# Patient Record
Sex: FEMALE | NOT HISPANIC OR LATINO | Employment: UNEMPLOYED | URBAN - METROPOLITAN AREA
[De-identification: names, ages, dates, MRNs, and addresses within clinical notes are randomized per-mention and may not be internally consistent; named-entity substitution may affect disease eponyms.]

---

## 2020-01-09 ENCOUNTER — TELEPHONE (OUTPATIENT)
Dept: FAMILY MEDICINE | Facility: CLINIC | Age: 30
End: 2020-01-09

## 2020-01-09 ENCOUNTER — ANCILLARY PROCEDURE (OUTPATIENT)
Dept: GENERAL RADIOLOGY | Facility: CLINIC | Age: 30
End: 2020-01-09
Attending: FAMILY MEDICINE
Payer: COMMERCIAL

## 2020-01-09 ENCOUNTER — OFFICE VISIT (OUTPATIENT)
Dept: FAMILY MEDICINE | Facility: CLINIC | Age: 30
End: 2020-01-09
Payer: COMMERCIAL

## 2020-01-09 VITALS
RESPIRATION RATE: 14 BRPM | HEIGHT: 71 IN | OXYGEN SATURATION: 100 % | WEIGHT: 131.5 LBS | HEART RATE: 71 BPM | BODY MASS INDEX: 18.41 KG/M2 | DIASTOLIC BLOOD PRESSURE: 56 MMHG | SYSTOLIC BLOOD PRESSURE: 86 MMHG | TEMPERATURE: 97.6 F

## 2020-01-09 DIAGNOSIS — F41.9 ANXIETY: ICD-10-CM

## 2020-01-09 DIAGNOSIS — M79.646 THUMB TENDERNESS: Primary | ICD-10-CM

## 2020-01-09 DIAGNOSIS — F33.2 SEVERE EPISODE OF RECURRENT MAJOR DEPRESSIVE DISORDER, WITHOUT PSYCHOTIC FEATURES (H): ICD-10-CM

## 2020-01-09 PROCEDURE — 73140 X-RAY EXAM OF FINGER(S): CPT | Mod: RT

## 2020-01-09 PROCEDURE — 99203 OFFICE O/P NEW LOW 30 MIN: CPT | Performed by: FAMILY MEDICINE

## 2020-01-09 PROCEDURE — 96127 BRIEF EMOTIONAL/BEHAV ASSMT: CPT | Performed by: FAMILY MEDICINE

## 2020-01-09 ASSESSMENT — PATIENT HEALTH QUESTIONNAIRE - PHQ9
5. POOR APPETITE OR OVEREATING: NEARLY EVERY DAY
SUM OF ALL RESPONSES TO PHQ QUESTIONS 1-9: 21

## 2020-01-09 ASSESSMENT — ANXIETY QUESTIONNAIRES
5. BEING SO RESTLESS THAT IT IS HARD TO SIT STILL: NEARLY EVERY DAY
IF YOU CHECKED OFF ANY PROBLEMS ON THIS QUESTIONNAIRE, HOW DIFFICULT HAVE THESE PROBLEMS MADE IT FOR YOU TO DO YOUR WORK, TAKE CARE OF THINGS AT HOME, OR GET ALONG WITH OTHER PEOPLE: SOMEWHAT DIFFICULT
GAD7 TOTAL SCORE: 17
2. NOT BEING ABLE TO STOP OR CONTROL WORRYING: MORE THAN HALF THE DAYS
6. BECOMING EASILY ANNOYED OR IRRITABLE: NEARLY EVERY DAY
7. FEELING AFRAID AS IF SOMETHING AWFUL MIGHT HAPPEN: MORE THAN HALF THE DAYS
1. FEELING NERVOUS, ANXIOUS, OR ON EDGE: MORE THAN HALF THE DAYS
3. WORRYING TOO MUCH ABOUT DIFFERENT THINGS: MORE THAN HALF THE DAYS

## 2020-01-09 ASSESSMENT — MIFFLIN-ST. JEOR: SCORE: 1401.26

## 2020-01-09 NOTE — PATIENT INSTRUCTIONS
1. Thumb tenderness  - XR Finger Right G/E 2 Views- no fractures  -wear wrist brace for comfort.  Ibuprofen with meals up to three times daily as needed    -if more concerns, see ortho

## 2020-01-09 NOTE — TELEPHONE ENCOUNTER
Called to give patient results and follow up instructions, if patient calls back please advise.    Xray showed no fracture  Dr. Martinez has sent an order for wrist brace too [facility]  If no improvement with wrist brace, she has placed a referral too [place] [facility] she can schedule with them at [phone]    Return for follow up or concerns in about 4 weeks ([id number])

## 2020-01-09 NOTE — NURSING NOTE
"Chief Complaint   Patient presents with     Musculoskeletal Problem     BP (!) 86/56   Pulse 71   Temp 97.6  F (36.4  C) (Oral)   Resp 14   Ht 1.793 m (5' 10.6\")   Wt 59.6 kg (131 lb 8 oz)   LMP 01/04/2020 (Exact Date)   SpO2 100%   BMI 18.55 kg/m   Estimated body mass index is 18.55 kg/m  as calculated from the following:    Height as of this encounter: 1.793 m (5' 10.6\").    Weight as of this encounter: 59.6 kg (131 lb 8 oz).  bp completed using cuff size: regular      Health Maintenance addressed:  NONE    n/a    Sandy Mcleod MA    "

## 2020-01-09 NOTE — PROGRESS NOTES
"Subjective     Sharri Bermeo is a 29 year old female who presents to clinic today for the following health issues:    HPI   Musculoskeletal problem/pain      Duration: 3 months    Description  Location: right thumb    Intensity:  severe    Accompanying signs and symptoms: swelling    History  Previous similar problem: no   Previous evaluation:  none    Precipitating or alleviating factors:  Trauma or overuse: YES- patient fell and used her arm to catch herself  Aggravating factors include: writing, or having to use the thumb at all    Therapies tried and outcome: heat and ice no relief    [facility] at [facility]  Admitted for alcoholism -since a month  History of depression , taking trazodone & buspirone, sertraline 100 mg once daily   PROBLEMS TO ADD ON...    BP Readings from Last 3 Encounters:   01/09/20 (!) 86/56   03/18/10 95/69    Wt Readings from Last 3 Encounters:   01/09/20 59.6 kg (131 lb 8 oz)   03/18/10 60.8 kg (134 lb)                    Reviewed and updated as needed this visit by Provider         Review of Systems   ROS COMP: Constitutional, HEENT, cardiovascular, pulmonary, GI, , musculoskeletal, neuro, skin, endocrine and psych systems are negative, except as otherwise noted.      Objective    BP (!) 86/56   Pulse 71   Temp 97.6  F (36.4  C) (Oral)   Resp 14   Ht 1.793 m (5' 10.6\")   Wt 59.6 kg (131 lb 8 oz)   LMP 01/04/2020 (Exact Date)   SpO2 100%   BMI 18.55 kg/m    Body mass index is 18.55 kg/m .  Physical Exam     Right thumb tenderness all long including snuff box. ROM is intact.  Rest of the hand/wrist exam- normal. No tendenress and full ROM  GENERAL: healthy, alert and no distress  RESP: lungs clear to auscultation - no rales, rhonchi or wheezes  CV: regular rates and rhythm and normal S1 S2, no S3 or S4  SKIN: no suspicious lesions or rashes  PSYCH: mentation appears normal, affect normal/bright  PHQ-9 SCORE 1/9/2020   PHQ-9 Total Score 21     WILLIE-7 SCORE 1/9/2020 "   Total Score 17       Diagnostic Test Results:  Labs reviewed in Epic        Assessment & Plan     1. Thumb tenderness, mild   - XR Finger Right G/E 2 Views- no fractures  -wear wrist brace for comfort.  Ibuprofen with meals up to three times daily as needed    -if more concerns, see ortho   - ORTHOPEDICS ADULT REFERRAL    2. Severe episode of recurrent major depressive disorder, without psychotic features (H)    3. Anxiety   at [facility]- pyschiatrits.  No medications changes           Return in about 4 weeks (around 2/6/2020) for concerns,unresolved.    Bambi Martinez MD  [facility]

## 2020-01-10 ASSESSMENT — ANXIETY QUESTIONNAIRES: GAD7 TOTAL SCORE: 17

## 2020-01-10 NOTE — RESULT ENCOUNTER NOTE
Please call patient ([facility] client)  Xray shows no fracture  Wear wrist brace for comfort.  Over the counter ibuprofen 600 mg three times daily as needed  For next 3-5 days  For pain   Ok to see orthopedic for further pain.

## 2020-01-10 NOTE — RESULT ENCOUNTER NOTE
Left message for patient to call back-    Nickie Polk MA  Medical Assistant  [facility]

## 2020-03-05 LAB — HIV 1+2 AB+HIV1 P24 AG SERPL QL IA: NONREACTIVE

## 2020-03-10 ENCOUNTER — OFFICE VISIT (OUTPATIENT)
Dept: FAMILY MEDICINE | Facility: CLINIC | Age: 30
End: 2020-03-10
Payer: COMMERCIAL

## 2020-03-10 VITALS
DIASTOLIC BLOOD PRESSURE: 68 MMHG | OXYGEN SATURATION: 100 % | WEIGHT: 141.5 LBS | SYSTOLIC BLOOD PRESSURE: 100 MMHG | RESPIRATION RATE: 14 BRPM | TEMPERATURE: 98.6 F | HEART RATE: 72 BPM

## 2020-03-10 DIAGNOSIS — F32.1 MAJOR DEPRESSIVE DISORDER, SINGLE EPISODE, MODERATE (H): Chronic | ICD-10-CM

## 2020-03-10 DIAGNOSIS — R10.13 EPIGASTRIC PAIN: ICD-10-CM

## 2020-03-10 DIAGNOSIS — K52.9 CHRONIC DIARRHEA: Primary | ICD-10-CM

## 2020-03-10 DIAGNOSIS — F43.10 PTSD (POST-TRAUMATIC STRESS DISORDER): Chronic | ICD-10-CM

## 2020-03-10 DIAGNOSIS — Z13.21 ENCOUNTER FOR VITAMIN DEFICIENCY SCREENING: ICD-10-CM

## 2020-03-10 PROBLEM — K21.9 ESOPHAGEAL REFLUX: Chronic | Status: ACTIVE | Noted: 2020-03-05

## 2020-03-10 PROBLEM — F10.20 ALCOHOL USE DISORDER, MODERATE, DEPENDENCE (H): Status: ACTIVE | Noted: 2017-05-27

## 2020-03-10 PROBLEM — F10.20 ALCOHOL USE DISORDER, MODERATE, DEPENDENCE (H): Chronic | Status: ACTIVE | Noted: 2017-05-27

## 2020-03-10 PROBLEM — K21.9 ESOPHAGEAL REFLUX: Status: ACTIVE | Noted: 2020-03-05

## 2020-03-10 LAB — CRP SERPL-MCNC: <2.9 MG/L (ref 0–8)

## 2020-03-10 RX ORDER — OMEGA-3/DHA/EPA/FISH OIL 60 MG-90MG
500 CAPSULE ORAL DAILY
COMMUNITY
Start: 2020-03-05 | End: 2020-08-20

## 2020-03-10 RX ORDER — SERTRALINE HYDROCHLORIDE 100 MG/1
TABLET, FILM COATED ORAL
Qty: 110 TABLET | Refills: 3 | Status: SHIPPED | OUTPATIENT
Start: 2020-03-10 | End: 2020-05-09

## 2020-03-10 RX ORDER — MULTIVITAMIN,THERAPEUTIC
1 TABLET ORAL EVERY MORNING
Qty: 90 TABLET | Refills: 3 | Status: SHIPPED | OUTPATIENT
Start: 2020-03-10 | End: 2020-08-20

## 2020-03-10 RX ORDER — BUSPIRONE HYDROCHLORIDE 15 MG/1
30 TABLET ORAL 3 TIMES DAILY PRN
Qty: 180 TABLET | Refills: 5 | Status: SHIPPED | OUTPATIENT
Start: 2020-03-10 | End: 2020-08-20

## 2020-03-10 RX ORDER — PRAZOSIN HYDROCHLORIDE 2 MG/1
2 CAPSULE ORAL AT BEDTIME
COMMUNITY
Start: 2020-03-05

## 2020-03-10 RX ORDER — ONDANSETRON 4 MG/1
4 TABLET, FILM COATED ORAL EVERY 8 HOURS PRN
COMMUNITY
Start: 2020-03-05 | End: 2020-03-10

## 2020-03-10 RX ORDER — BUSPIRONE HYDROCHLORIDE 10 MG/1
10 TABLET ORAL 3 TIMES DAILY PRN
COMMUNITY
Start: 2020-03-05 | End: 2020-03-10

## 2020-03-10 RX ORDER — SERTRALINE HYDROCHLORIDE 100 MG/1
100 TABLET, FILM COATED ORAL DAILY
COMMUNITY
Start: 2020-03-05 | End: 2020-03-10

## 2020-03-10 RX ORDER — VITAMIN A ACETATE, BETA CAROTENE, ASCORBIC ACID, CHOLECALCIFEROL, .ALPHA.-TOCOPHEROL ACETATE, DL-, THIAMINE MONONITRATE, RIBOFLAVIN, NIACINAMIDE, PYRIDOXINE HYDROCHLORIDE, FOLIC ACID, CYANOCOBALAMIN, CALCIUM CARBONATE, FERROUS FUMARATE, ZINC OXIDE, CUPRIC OXIDE 3080; 12; 120; 400; 1; 1.84; 3; 20; 22; 920; 25; 200; 27; 10; 2 [IU]/1; UG/1; MG/1; [IU]/1; MG/1; MG/1; MG/1; MG/1; MG/1; [IU]/1; MG/1; MG/1; MG/1; MG/1; MG/1
1 TABLET, FILM COATED ORAL DAILY
COMMUNITY
Start: 2020-03-05 | End: 2020-03-10

## 2020-03-10 RX ORDER — TRAZODONE HYDROCHLORIDE 100 MG/1
50 TABLET ORAL
COMMUNITY
Start: 2020-03-05

## 2020-03-10 NOTE — NURSING NOTE
29 year old  Chief Complaint   Patient presents with     Recheck Medication     wants her medications changed, states [facility] decreased dosages and didn't remember to prescribe her vitamins       Blood pressure 100/68, pulse 72, temperature 98.6  F (37  C), temperature source Oral, resp. rate 14, weight 64.2 kg (141 lb 8 oz), last menstrual period 02/28/2020, SpO2 100 %, not currently breastfeeding. There is no height or weight on file to calculate BMI.  Patient Active Problem List   Diagnosis     PTSD (post-traumatic stress disorder)     Major depressive disorder, single episode, moderate (H)     Esophageal reflux     Alcohol use disorder, moderate, dependence (H)       Wt Readings from Last 2 Encounters:   03/10/20 64.2 kg (141 lb 8 oz)     BP Readings from Last 3 Encounters:   03/10/20 100/68         Current Outpatient Medications   Medication     busPIRone (BUSPAR) 10 MG tablet     Omega-3 Fatty Acids (FISH OIL) 500 MG CAPS     ondansetron (ZOFRAN) 4 MG tablet     prazosin (MINIPRESS) 2 MG capsule     Prenatal Vit-Fe Fumarate-FA (PRENATAL PLUS) 27-1 MG TABS     sertraline (ZOLOFT) 100 MG tablet     Skin Protectants, Misc. (EUCERIN) cream     traZODone (DESYREL) 100 MG tablet     No current facility-administered medications for this visit.        Social History     Tobacco Use     Smoking status: Never Smoker     Smokeless tobacco: Never Used   Substance Use Topics     Alcohol use: Not Currently     Drug use: Never       Health Maintenance Due   Topic Date Due     PREVENTIVE CARE VISIT  1988     DEPRESSION ACTION PLAN  1988     PHQ-9  1988     DTAP/TDAP/TD IMMUNIZATION (1 - Tdap) 08/23/1999     HIV SCREENING  08/23/2003     PNEUMOCOCCAL IMMUNIZATION 19-64 MEDIUM RISK (1 of 1 - PPSV23) 08/23/2007     PAP  08/23/2009     INFLUENZA VACCINE (1) 09/01/2019       No results found for: PAP      March 10, 2020 3:34 PM

## 2020-03-10 NOTE — LETTER
March 12, 2020      Sharri Bermeo  [address] [address]        Dear ,      Your blood tests looked good.    The test for inflammation (CRP) was normal, which makes an inflammatory bowel disease less likely (though it isn't as good as the stool test for inflammation).    The test for celiac disease (tissue transglutaminase antibody and IgA) was negative.    Your vitamin D level was normal. The multivitamin has vitamin D in it which will help keep your level normal. We can talk next winter about doing a higher dose supplement as well.     Please reach out if you have any questions.      Rell Ramirez MD  2:15 PM, March 12, 2020      Resulted Orders   CRP inflammation   Result Value Ref Range    CRP Inflammation <2.9 0.0 - 8.0 mg/L   Tissue transglutaminase antibody IgA   Result Value Ref Range    Tissue Transglutaminase Antibody IgA 1 <7 U/mL      Comment:      Negative  The tTG-IgA assay has limited utility for patients with decreased levels of   IgA. Screening for celiac disease should include IgA testing to rule out   selective IgA deficiency and to guide selection and interpretation of   serological testing. tTG-IgG testing may be positive in celiac disease   patients with IgA deficiency.     IgA   Result Value Ref Range     84 - 499 mg/dL   Vitamin D Deficiency   Result Value Ref Range    Vitamin D Deficiency screening 28 20 - 75 ug/L      Comment:      Season, race, dietary intake, and treatment affect the concentration of   25-hydroxy-Vitamin D. Values may decrease during winter months and increase   during summer months. Values 20-29 ug/L may indicate Vitamin D insufficiency   and values <20 ug/L may indicate Vitamin D deficiency.  Vitamin D determination is routinely performed by an immunoassay specific for   25 hydroxyvitamin D3.  If an individual is on vitamin D2 (ergocalciferol)   supplementation, please specify 25 OH vitamin D2 and D3 level determination by   LCMSMS  test VITD23.         If you have any questions or concerns, please call the clinic at the number listed above.       Sincerely,        Rell Ramirez MD

## 2020-03-10 NOTE — PROGRESS NOTES
"  SUBJECTIVE:   Sharri Bermeo is a 29 year old female who presents to clinic today to establish care and to discuss the following problem(s).    - was seen for a physical at [facility] 5 days ago (3/5/20)  - 3/5/20 Hepatitis A IgG reactive  - 3/5/20 Hepatitis B surface antibiody reactive  - 3/5/20 Hepatitis C antibody nonreactive  - 3/5/20 HIV nonreactive  - 3/5/20 Quantiferon TB Gold Plus negative  - 3/5/20 Urine pregnancy test negative    # PTSD  # MDD   # AUD  - currently at residential treatment facility [facility] for the next year    - usually takes Buspirone 30mg three times a day  - at initial intake visit into [facility] this order was changed to 10mg three times a day for unclear reasons  - anxiety has been worse as a result    - typically also on Sertraline 200mg daily  - at some point between transfer to [place] and back again to [facility] this was reduced to 100mg daily  - would like to go back to her normal dose    - typically also on Trazodone 100mg at bedtime for sleep  - since being in [facility] has been on 50mg daily    # Vomiting  - since starting [facility] has been getting nauseated and throwing up  - has been going on for months  - feels nauseated after ever meal and sometimes before  - takes 4 Tums intermittently which sometimes helps  \"a little bit\"    - sometimes feel \"really tight\" in upper abdomen when nausea is more severe  - feels like \"hangover pains\" and nausea  - no relationship with periods  - averaging around 5 BMs a day recnetly  - had some black stools initially, then some green stools, now normal color  - before starting [facility] would have just one BM a day  - would sometimes get upper abdominal pains when drinking heavily in the past    ROS: Denies fevers, chills, chest pain, difficulty breathing, abdominal pain    Past Medical History:   Diagnosis Date     History of alcohol use disorder      MDD (major depressive disorder)      PTSD (post-traumatic stress " disorder)      No past surgical history on file.  No family history on file.  Social History     Tobacco Use     Smoking status: Never Smoker     Smokeless tobacco: Never Used   Substance Use Topics     Alcohol use: Not Currently     Drug use: Never     Social History     Social History Narrative     Not on file       Current Outpatient Medications   Medication     busPIRone (BUSPAR) 15 MG tablet     multivitamin, therapeutic (THERA-VIT) TABS tablet     prazosin (MINIPRESS) 2 MG capsule     sertraline (ZOLOFT) 100 MG tablet     Skin Protectants, Misc. (EUCERIN) cream     traZODone (DESYREL) 100 MG tablet     Omega-3 Fatty Acids (FISH OIL) 500 MG CAPS     No current facility-administered medications for this visit.      I have reviewed the patient's past medical, surgical, family, and social history.     OBJECTIVE:   /68 (BP Location: Right arm, Patient Position: Sitting, Cuff Size: Adult Regular)   Pulse 72   Temp 98.6  F (37  C) (Oral)   Resp 14   Wt 64.2 kg (141 lb 8 oz)   LMP 02/28/2020 (Within Days)   SpO2 100%   Breastfeeding No     Constitutional: well-appearing, appears stated age  Eyes: conjunctivae without erythema, sclera anicteric.   Abdomen: slightly hyperactive bowel sounds. Tender to palpation in epigastric and RUQ areas without rebound guarding.  Skin: no rashes, lesions, or wounds  Psych: affect is full and appropriate, speech is fluent and non-pressured    ASSESSMENT AND PLAN:     (K52.9) Chronic diarrhea  (primary encounter diagnosis)  Comment: Differential includes IBS, IBD, giardia, malabsorption. Blood today for CRP (for IBD), TTG/IgA (for celiac). Stool tests when available for calprotectin (for IBD) and giardia. If normal, would plan to manage as IBS unless she starts losing weight, in which case I would send for colonoscopy/biopsy.   Plan: CRP inflammation, Tissue transglutaminase         antibody IgA, IgA, Calprotectin Feces,         Cryptosporidium/Giardia Immunoassay           (R10.13) Epigastric pain  Comment: Has had similar symptoms with heavy drinking which makes me think dyspepsia/gastropathy. Checking stool H. Pylori antigen. Once we have that sample, would plan for trial of PPI.   Plan: Helicobacter pylori Antigen Stool          (F43.10) PTSD (post-traumatic stress disorder)  (F32.1) Major depressive disorder, single episode, moderate (H)  Comment: Sharri would like to increase her medications back to their regular doses, and I do not see a reason why they were changed. Will increase Buspar first, back to 30mg three times daily. Once stable there, will plan to titrate Zoloft back to 200mg daily.   Plan: busPIRone (BUSPAR) 15 MG tablet, sertraline         (ZOLOFT) 100 MG tablet          (Z13.21) Encounter for vitamin deficiency screening  Comment:   Plan: Vitamin D Deficiency, multivitamin, therapeutic        (THERA-VIT) TABS tablet            Rell Ramirez MD   [facility]  03/12/2020, 2:11 PM

## 2020-03-11 LAB
DEPRECATED CALCIDIOL+CALCIFEROL SERPL-MC: 28 UG/L (ref 20–75)
IGA SERPL-MCNC: 201 MG/DL (ref 84–499)
TTG IGA SER-ACNC: 1 U/ML

## 2020-03-20 ENCOUNTER — TELEPHONE (OUTPATIENT)
Dept: FAMILY MEDICINE | Facility: CLINIC | Age: 30
End: 2020-03-20

## 2020-03-20 DIAGNOSIS — R10.13 EPIGASTRIC PAIN: ICD-10-CM

## 2020-03-20 DIAGNOSIS — K52.9 CHRONIC DIARRHEA: ICD-10-CM

## 2020-03-20 NOTE — TELEPHONE ENCOUNTER
Spoke to Marilu from [facility]. No new symptoms, patient continues with loose stools/diarrhea. No bloody stools, bloody emesis, abdominal pain. Patient is dropping off stool samples for pending labs. Nothing to do until test results available, so no need for office visit.     Vicki Fields RN  03/20/20  10:12 AM

## 2020-03-20 NOTE — TELEPHONE ENCOUNTER
[facility] Call Center    Phone Message    May a detailed message be left on voicemail: yes     Reason for Call: Other: Can the 4.23.20 appt be for today? The pt has had diarrhea since her last visit of 3.10.20. She is coming to drop off stool sample-can she see Dr Ramirez today too? Please call Marilu at [phone] to discuss-schedule. Thanks.     Action Taken: Message routed to:  [facility] [place]    Travel Screening: Not Applicable

## 2020-03-20 NOTE — TELEPHONE ENCOUNTER
Called and LVM - if patient would still like to speak to a nurse, please call back and let the call center know you missed a call from nurse. OK to transfer this call to clinic.     Vicki Fields RN  03/20/20  9:37 AM

## 2020-03-23 LAB
C PARVUM AG STL QL IA: NEGATIVE
CALPROTECTIN STL-MCNT: 96.7 MG/KG (ref 0–49.9)
G LAMBLIA AG STL QL IA: NEGATIVE
H PYLORI AG STL QL IA: POSITIVE
SPECIMEN SOURCE: NORMAL

## 2020-03-24 PROBLEM — M79.646: Status: RESOLVED | Noted: 2020-01-09 | Resolved: 2020-03-24

## 2020-03-24 PROBLEM — A04.8 POSITIVE H. PYLORI TEST: Status: ACTIVE | Noted: 2020-03-24

## 2020-04-27 ENCOUNTER — TELEPHONE (OUTPATIENT)
Dept: FAMILY MEDICINE | Facility: CLINIC | Age: 30
End: 2020-04-27

## 2020-04-27 NOTE — TELEPHONE ENCOUNTER
Called Nikia at [facility] to ask what medication the patient is requesting a visit, as patient is scheduled on 4/28/20 for medication refill. Chart review shows meds recently filled. Nikia will talk to patient and determine which medication patient is requesting and call clinic to update.     Call Center - OK to put call from Nikia from [facility] through to clinic.    Vicki Fields RN  04/27/20  3:40 PM

## 2020-04-27 NOTE — TELEPHONE ENCOUNTER
Nikia called back, informed Call Center that patient is asking for Refills of sertraline and multivitamins.  I called [facility] that patient has refills of both of these medications, they were sent to [facility] on 3/10/20, please contact pharmacy for refills. If patient is asking for other medications, we would need to know which medications patient is requesting, so please call back to discuss. At this time, there is not a need for appointment and the patient's appointment will be canceled for 4/28/20.     Multivitamin - last filled 3/10/20 #90 + 3 refills  Sertraline - last filled 3/10/20 for 6 month supply    Vicki Fields, [facility]  04/27/20  4:22 PM

## 2020-04-28 ENCOUNTER — VIRTUAL VISIT (OUTPATIENT)
Dept: FAMILY MEDICINE | Facility: CLINIC | Age: 30
End: 2020-04-28
Payer: COMMERCIAL

## 2020-04-28 ENCOUNTER — TELEPHONE (OUTPATIENT)
Dept: FAMILY MEDICINE | Facility: CLINIC | Age: 30
End: 2020-04-28

## 2020-04-28 DIAGNOSIS — F33.2 SEVERE EPISODE OF RECURRENT MAJOR DEPRESSIVE DISORDER, WITHOUT PSYCHOTIC FEATURES (H): ICD-10-CM

## 2020-04-28 DIAGNOSIS — F43.10 PTSD (POST-TRAUMATIC STRESS DISORDER): Chronic | ICD-10-CM

## 2020-04-28 DIAGNOSIS — K29.70 HELICOBACTER PYLORI GASTRITIS: Primary | ICD-10-CM

## 2020-04-28 DIAGNOSIS — K21.9 GASTROESOPHAGEAL REFLUX DISEASE WITHOUT ESOPHAGITIS: ICD-10-CM

## 2020-04-28 DIAGNOSIS — F10.20 ALCOHOL USE DISORDER, MODERATE, DEPENDENCE (H): Chronic | ICD-10-CM

## 2020-04-28 DIAGNOSIS — B96.81 HELICOBACTER PYLORI GASTRITIS: Primary | ICD-10-CM

## 2020-04-28 RX ORDER — CLARITHROMYCIN 500 MG
500 TABLET ORAL 2 TIMES DAILY
Qty: 28 TABLET | Refills: 0 | Status: SHIPPED | OUTPATIENT
Start: 2020-04-28 | End: 2020-05-12

## 2020-04-28 RX ORDER — AMOXICILLIN 500 MG/1
1000 CAPSULE ORAL 2 TIMES DAILY
Qty: 56 CAPSULE | Refills: 0 | Status: SHIPPED | OUTPATIENT
Start: 2020-04-28 | End: 2020-05-12

## 2020-04-28 ASSESSMENT — ANXIETY QUESTIONNAIRES
6. BECOMING EASILY ANNOYED OR IRRITABLE: MORE THAN HALF THE DAYS
GAD7 TOTAL SCORE: 14
5. BEING SO RESTLESS THAT IT IS HARD TO SIT STILL: NEARLY EVERY DAY
IF YOU CHECKED OFF ANY PROBLEMS ON THIS QUESTIONNAIRE, HOW DIFFICULT HAVE THESE PROBLEMS MADE IT FOR YOU TO DO YOUR WORK, TAKE CARE OF THINGS AT HOME, OR GET ALONG WITH OTHER PEOPLE: VERY DIFFICULT
1. FEELING NERVOUS, ANXIOUS, OR ON EDGE: MORE THAN HALF THE DAYS
3. WORRYING TOO MUCH ABOUT DIFFERENT THINGS: MORE THAN HALF THE DAYS
2. NOT BEING ABLE TO STOP OR CONTROL WORRYING: MORE THAN HALF THE DAYS
7. FEELING AFRAID AS IF SOMETHING AWFUL MIGHT HAPPEN: NOT AT ALL

## 2020-04-28 ASSESSMENT — PATIENT HEALTH QUESTIONNAIRE - PHQ9
5. POOR APPETITE OR OVEREATING: NEARLY EVERY DAY
SUM OF ALL RESPONSES TO PHQ QUESTIONS 1-9: 16

## 2020-04-28 NOTE — PATIENT INSTRUCTIONS
Stop the multivitamin for the next two weeks--I think this is making your stomach worse. You should also avoid the use of ibuprofen and Aleve/naproxen.    It is important to treat the H. Pylori.  See information below.  Take the omeprazole 20 mg twice daily for 2-4 weeks.  Take the two antibiotics as prescribed.    You have been prescribed an antibiotic to treat a bacterial infection. Watch for signs of allergic reaction including swelling around the mouth or eyes and the development of a rash.  If you develop any of these symptoms, stop your medication, take a benadryl (25-50 mg) and give our office a call. Consider probiotic therapy, such as eating yogurt 2 times daily, to decrease the possibility of diarrhea associated with antibiotic use.    I think it would be best if you were followed by a psychiatrist for your mental health medications.  You can consider going to one you have seen before OR I have placed a referral to the [facility] Psychiat[facility] department. You can call to make an appointment.    I would like you to come into the clinic for an office visit to follow up on your stomach issues AND to address your concerns about nutritional deficiencies. We are planning to do blood tests that day.        Patient Education     Gastritis (Adult)    Gastritis is inflammation and irritation of the stomach lining. You can have it for a short time (acute) or be long lasting (chronic). Infection with bacteria called H pylori most often causes gastritis. More than a third of people in the [place] have these bacteria in their bodies. In many cases, H pylori causes no problems or symptoms. In some people, though, the infection irritates the stomach lining and causes gastritis. H. pylori may be diagnosed through blood, stool, or breath tests, we well as through biopsy during an endoscopy. Other causes of stomach irritation include drinking alcohol, smoking or chewing tobacco, or taking pain-relieving medicines called NSAIDs  (such as aspirin or ibuprofen). Certain drugs (such as cocaine) and immune conditions can also cause gastritis.  Symptoms of gastritis can include:    Belly pain or bloating    Feeling full quickly    Loss of appetite    Nausea or vomiting    Vomiting blood or having black stools    Feeling more tired than usual  An inflamed and irritated stomach lining is more likely to develop a sore called an ulcer. To help prevent this, gastritis should be treated.  Home care  If needed, our healthcare provider may prescribe medicines. If you have H pylori infection, treating it will likely relieve your symptoms. Other changes can help reduce stomach irritation and help it heal.    If you have been prescribed medicines for H pylori infection, take them as directed. Take all of the medicine until it is finished or your healthcare provider tells you to stop, even if you feel better.    Your healthcare provider may advise you not to take NSAIDs. If you take daily aspirin for your heart or other medical reasons, do not stop without talking to your healthcare provider first.    Don't drink alcohol.    Stop smoking. Smoking can irritate the stomach and delay healing. As much as possible, stay away from second hand smoke.  Follow-up care  Follow up with your healthcare provider, or as advised by our staff. You may need testing to check for inflammation or an ulcer.  When to seek medical advice  Call your healthcare provider for any of the following:    Stomach pain that gets worse or moves to the lower right belly (appendix area)    Chest pain that appears or gets worse, or spreads to the back, neck, shoulder, or arm    Frequent vomiting (can t keep down liquids)    Blood in the stool or vomit (red or black in color)    Feeling weak or dizzy    Shortness of breath    Unexplained weight loss    Fever of 100.4 F (38 C) or higher, or as directed by your healthcare provider  Date Last Reviewed: 3/1/2018    7366-3282 [employer],  [facility]. [address] [place] [address]. All rights reserved. This information is not intended as a substitute for professional medical care. Always follow your healthcare professional's instructions.

## 2020-04-28 NOTE — TELEPHONE ENCOUNTER
Called and LVM at [facility] for Nikia. Call back to discuss need for the appointment, if there is a request for change in dosing due to symptoms.     Call Center - OK to put through to [facility] or OK to schedule an appointment with EBER ANTHONY or DR. THELMA JORDAN.    Vicki Fields RN  04/28/20  11:39 AM

## 2020-04-28 NOTE — TELEPHONE ENCOUNTER
[facility] Call Center    Phone Message    May a detailed message be left on voicemail: yes     Reason for Call: Medication Question or concern regarding medication   Prescription Clarification  Name of Medication: sertraline (ZOLOFT) 100 MG tablet and multivitamin, therapeutic (THERA-VIT) TABS tablet  Prescribing Provider: Dr. Ramirez   Pharmacy: [facility] [id number] - [place], [place] - [address]    What on the order needs clarification? Patient is requesting to discuss the dosage for sertraline (ZOLOFT) 100 MG tablet and multivitamin, therapeutic (THERA-VIT) TABS tablet. Patient had an appointment scheduled for today 04/28/2020, but it was cancelled. Please advise.    Action Taken: Message routed to:  [facility]: [facility]    Travel Screening: Not Applicable

## 2020-04-28 NOTE — PROGRESS NOTES
"Family Medicine Telephone Visit Note      Sharri Bermeo is a 29 year old female who is being evaluated via a billable telephone visit.      The patient has been notified of following:     \"This telephone visit will be conducted via a call between you and your physician/provider. We have found that certain health care needs can be provided without the need for a physical exam.  This service lets us provide the care you need with a short phone conversation.  If a prescription is necessary we can send it directly to your pharmacy.  If lab work is needed we can place an order for that and you can then stop by our lab to have the test done at a later time.    Telephone visits are billed at different rates depending on your insurance coverage. During this emergency period, for some insurers they may be billed the same as an in-person visit.  Please reach out to your insurance provider with any questions.    If during the course of the call the physician/provider feels a telephone visit is not appropriate, you will not be charged for this service.\"    Patient has given verbal consent for Telephone visit?  Yes    How would you like to obtain your AVS? Mail a copy    Sharri Bermeo complains of   Chief Complaint   Patient presents with     Medication Request     increase in sertraline     Abdominal Pain       ALLERGIES  Patient has no known allergies.    ABDOMINAL PAIN:  This is patients first visit with me.  She continues to complain of epigastric abdominal pain, occasional nausea and diarrhea. Worse in the morning. She had a positive stool test for H pylori 3/20/2020.  Because she is at [facility] she has been unable to get an appointment.  With COVID she was told she could not come in for a visit--in the the confusion she was not treated. Diarrhea symptoms have improved though she continues to have 1-2 loose stools daily. Epigastric pain has improved somewhat but still significant.     Onset: several " months    Description:   Character: Dull ache, Gnawing and Burning  Location: epigastric region  Radiation: None    Intensity: moderate    Progression of Symptoms:  same    Accompanying Signs & Symptoms:  Fever/Chills?: no   Gas/Bloating: no   Nausea: YES  Vomitting: YES  Diarrhea?: YES- this has improved since her visit in March.  Now occasional diarrhea  Constipation:no   Dysuria or Hematuria: no    History:   Trauma: no   Previous similar pain: no    Previous tests done: all labs reviewed from last office visit    Precipitating factors:   Does the pain change with:     Food: YES- improves slightly     BM: no     Urination: no     Alleviating factors:  Eating helps for a short period of time    Therapies Tried and outcome: omeprazole has helped but still has abdominal pain everyday.    LMP:  Patient's last menstrual period was 03/14/2020.         Concern for nutritional deficiencies:  Patient has noted splitting of her fingernails and unexpected weight gain.  She eats a healthy diet but has gained weight in the 5 months she has been in treatment. She has also noted hair loss. She has not had a recent thyroid or blood count.    Depression and Anxiety Follow-Up:  Sharri has a long standing history of mental health issues.  Alcohol dependence, and PTSD with panic. She has been seeing a counselor Hailey Pham.  Patient is concerned she has ADD that needs to be treated.  She describes feeling tired all the time and having difficulty getting through her day. It is unclear if this is a previous diagnosis and if she has had an evaluation for this. She reports that she has seen a psychiatrist in the recent past but she is not sure who.  It sounds like she has been in different health systems in the past several months. Was in [place] before [employer] [place].     How are you doing with your depression since your last visit? Improved     How are you doing with your anxiety since your last visit?  Improved     Are you having  other symptoms that might be associated with depression or anxiety? No    Have you had a significant life event? OTHER: Managing long term treatment in light of Covid-19 pandemic     Do you have any concerns with your use of alcohol or other drugs? No    Social History     Tobacco Use     Smoking status: Never Smoker     Smokeless tobacco: Never Used   Substance Use Topics     Alcohol use: Not Currently     Drug use: Never     PHQ 1/9/2020 4/28/2020   PHQ-9 Total Score 21 16   Q9: Thoughts of better off dead/self-harm past 2 weeks Not at all Not at all     WILLIE-7 SCORE 1/9/2020 4/28/2020   Total Score 17 14     In the past two weeks have you had thoughts of suicide or self-harm?  No.    Do you have concerns about your personal safety or the safety of others?   No      Patient Active Problem List   Diagnosis     Anxiety     Severe episode of recurrent major depressive disorder, without psychotic features (H)     PTSD (post-traumatic stress disorder)     Major depressive disorder, single episode, moderate (H)     Esophageal reflux     Alcohol use disorder, moderate, dependence (H)     Positive H. pylori test     Past Surgical History:   Procedure Laterality Date     NO HISTORY OF SURGERY         Social History     Tobacco Use     Smoking status: Never Smoker     Smokeless tobacco: Never Used   Substance Use Topics     Alcohol use: Not Currently     Family History   Problem Relation Age of Onset     Alcoholism Mother      Depression Mother      Hypertension Father      Eye Disorder Father      Depression Sister      Eye Disorder Sister          Current Outpatient Medications   Medication Sig Dispense Refill     amoxicillin (AMOXIL) 500 MG capsule Take 2 capsules (1,000 mg) by mouth 2 times daily for 14 days 56 capsule 0     busPIRone (BUSPAR) 15 MG tablet Take 2 tablets (30 mg) by mouth 3 times daily as needed (anxiety) 180 tablet 5     clarithromycin (BIAXIN) 500 MG tablet Take 1 tablet (500 mg) by mouth 2 times daily  for 14 days 28 tablet 0     multivitamin, therapeutic (THERA-VIT) TABS tablet Take 1 tablet by mouth every morning 90 tablet 3     Omega-3 Fatty Acids (FISH OIL) 500 MG CAPS Take 500 mg by mouth daily       omeprazole (PRILOSEC) 20 MG DR capsule Take 1 capsule (20 mg) by mouth 2 times daily 60 capsule 0     prazosin (MINIPRESS) 2 MG capsule Take 2 mg by mouth At Bedtime       sertraline (ZOLOFT) 100 MG tablet Take 1 tablet (100 mg) by mouth daily for 10 days, THEN 2 tablets (200 mg) daily. 110 tablet 3     traZODone (DESYREL) 100 MG tablet Take 50 mg by mouth nightly as needed       Skin Protectants, Misc. (EUCERIN) cream Apply 1 Application topically 2 times daily as needed for dry skin       No Known Allergies    Reviewed and updated as needed this visit by Provider  Tobacco  Allergies  Meds  Problems  Med Hx  Surg Hx  Fam Hx         Review of Systems   ROS COMP: Constitutional, HEENT, cardiovascular, pulmonary, gi and gu systems are negative, except as otherwise noted.       Objective   Reported vitals:  [facility] 03/14/2020  Afebrile  General: healthy, alert and no distress  Psych: well dressed and groomed.  Good eye contact and is cooperative. Thoughts linear.  No delusions, compulsions or paranoia.  Affect bright.  Patient denies homicidal and suicidal ideation as well as no thoughts or actions of self-harm.        Assessment/Plan:    ICD-10-CM    1. Helicobacter pylori gastritis  K29.70 clarithromycin (BIAXIN) 500 MG tablet    B96.81 omeprazole (PRILOSEC) 20 MG DR capsule     amoxicillin (AMOXIL) 500 MG capsule   2. GERD (gastroesophageal reflux disease)  K21.9    3. Alcohol use disorder, moderate, dependence (H)  F10.20 MENTAL HEALTH REFERRAL  - Adult; Psychiatry; Psychiatry; P: Psychiatry Clinic [phone]; We will contact you to schedule the appointment or please call with any questions   4. PTSD (post-traumatic stress disorder)  F43.10 MENTAL HEALTH REFERRAL  - Adult; Psychiatry; Psychiatry; P:  Psychiatry Clinic [phone]; We will contact you to schedule the appointment or please call with any questions   5. Severe episode of recurrent major depressive disorder, without psychotic features (H)  F33.2 MENTAL HEALTH REFERRAL  - Adult; Psychiatry; Psychiatry; [facility]: Psychiatry Clinic [phone]; We will contact you to schedule the appointment or please call with any questions   See patient instructions  Stop the multivitamin for the next two weeks--I think this is making your stomach worse. You should also avoid the use of ibuprofen and Aleve/naproxen.    It is important to treat the H. Pylori.  See information below.  Take the omeprazole 20 mg twice daily for 2-4 weeks.  Take the two antibiotics as prescribed.    You have been prescribed an antibiotic to treat a bacterial infection. Watch for signs of allergic reaction including swelling around the mouth or eyes and the development of a rash.  If you develop any of these symptoms, stop your medication, take a benadryl (25-50 mg) and give our office a call. Consider probiotic therapy, such as eating yogurt 2 times daily, to decrease the possibility of diarrhea associated with antibiotic use.    I think it would be best if you were followed by a psychiatrist for your mental health medications.  You can consider going to one you have seen before OR I have placed a referral to the [facility] Psychiatry department. You can call to make an appointment.    I would like you to come into the clinic for an office visit to follow up on your stomach issues AND to address your concerns about nutritional deficiencies. We are planning to do blood tests that day.    Return in about 3 weeks (around 5/19/2020) for [facility] if any problems or concerns..    After Visit Information:  Faxed to RN desk at [facility]    Appointment start time: 3:20 PM  Appointment end time: 3:55 PM  Phone call duration:  35 minutes    Cecy Huang PA-C

## 2020-04-29 ASSESSMENT — ANXIETY QUESTIONNAIRES: GAD7 TOTAL SCORE: 14

## 2020-05-19 ENCOUNTER — OFFICE VISIT (OUTPATIENT)
Dept: FAMILY MEDICINE | Facility: CLINIC | Age: 30
End: 2020-05-19
Payer: COMMERCIAL

## 2020-05-19 VITALS
HEIGHT: 71 IN | SYSTOLIC BLOOD PRESSURE: 109 MMHG | RESPIRATION RATE: 15 BRPM | DIASTOLIC BLOOD PRESSURE: 78 MMHG | TEMPERATURE: 98.2 F | OXYGEN SATURATION: 99 % | HEART RATE: 72 BPM | BODY MASS INDEX: 21 KG/M2 | WEIGHT: 150 LBS

## 2020-05-19 DIAGNOSIS — B96.81 HELICOBACTER PYLORI GASTRITIS: Primary | ICD-10-CM

## 2020-05-19 DIAGNOSIS — N92.4 EXCESSIVE BLEEDING IN PREMENOPAUSAL PERIOD: ICD-10-CM

## 2020-05-19 DIAGNOSIS — K29.70 HELICOBACTER PYLORI GASTRITIS: Primary | ICD-10-CM

## 2020-05-19 DIAGNOSIS — L65.9 HAIR LOSS: ICD-10-CM

## 2020-05-19 LAB — TSH SERPL DL<=0.005 MIU/L-ACNC: 0.76 MU/L (ref 0.4–4)

## 2020-05-19 RX ORDER — PRENATAL VIT/IRON FUM/FOLIC AC 27MG-0.8MG
1 TABLET ORAL DAILY
Qty: 90 TABLET | Refills: 3 | COMMUNITY
Start: 2020-05-19 | End: 2022-04-21

## 2020-05-19 ASSESSMENT — MIFFLIN-ST. JEOR: SCORE: 1489.4

## 2020-05-19 NOTE — PROGRESS NOTES
"Subjective     Sharri Bermeo is a 29 year old female who presents to clinic today for the following health issues:    HPI   Multiple concerns:    1. H. Pylori gastritis:  Has positive testing a couple months ago and due to covid and her being at [facility] it took awhile for her to get in  For treatment.  She completed treatment and her stomach is feeling much better.  The diarrhea has resolved as well.    2. Hairloss:  She has noted this over the past several months to year.  Loss is concentrated along the anterior hairline.  No scaling or redness.  Hair is not breaking.  She denies trauma and says she does not pull her hair tightly. This really bothers her and she usually keeps her head covered.    3.  She requests a prescription for prenatal vitamins because of her periods being heavy.    Reviewed and updated as needed this visit by Provider  Tobacco  Allergies  Meds  Problems  Med Hx  Surg Hx  Fam Hx         Review of Systems   Constitutional, HEENT, cardiovascular, pulmonary, gi and gu systems are negative, except as otherwise noted.      Objective    /78 (BP Location: Right arm, Patient Position: Sitting, Cuff Size: Adult Regular)   Pulse 72   Temp 98.2  F (36.8  C) (Oral)   Resp 15   Ht 1.8 m (5' 10.87\")   Wt 68 kg (150 lb)   LMP 05/12/2020   SpO2 99%   BMI 21.00 kg/m    Body mass index is 21 kg/m .  Physical Exam   GENERAL: healthy, alert and no distress  EYES: Eyes grossly normal to inspection, PERRL and conjunctivae and sclerae normal  HENT: ear canals and TM's normal, nose and mouth without ulcers or lesions  NECK: no adenopathy, no asymmetry, masses, or scars and thyroid normal to palpation  RESP: lungs clear to auscultation - no rales, rhonchi or wheezes  CV: regular rate and rhythm, normal S1 S2, no S3 or S4, no murmur, click or rub, no peripheral edema and peripheral pulses strong  ABDOMEN: soft, nontender, no hepatosplenomegaly, no masses and bowel sounds normal  MS: no " gross musculoskeletal defects noted, no edema  SKIN: no suspicious lesions or rashes.  Hair thinning along the anterior hairline.  No hair breakage or sign of fungal infection  PSYCH: well dressed and groomed.  Good eye contact and is cooperative. Thoughts linear.  No delusions, compulsions or paranoia.  Affect flat.  Patient denies homicidal and suicidal ideation as well as no thoughts or actions of self-harm.      Diagnostic Test Results:  Results for orders placed or performed in visit on 05/19/20   TSH with free T4 reflex     Status: None   Result Value Ref Range    TSH 0.76 0.40 - 4.00 mU/L   CBC with Diff Plt ([facility])     Status: Abnormal   Result Value Ref Range    WBC 5.0 4.0 - 11.0 K/uL    Lymphocytes # 1.7 0.8 - 5.3 K/uL    % Lymphocytes 33.3 20.0 - 48.0 %L    Mid # 0.8 0.0 - 2.2 K/uL    Mid % 15.5 0.0 - 20.0 %M    GRANULOCYTES # 2.5 1.6 - 8.3 K/uL    % Granulocytes 51.2 40.0 - 75.0 %G    RBC 4.41 3.80 - 5.20 M/uL    Hemoglobin 12.4 11.7 - 15.7 g/dL    Hematocrit 39.0 35.0 - 47.0 %    MCV 88.4 78.0 - 100.0 fL    MCH 28.1 26.5 - 35.0 pg    MCHC 31.8 (L) 32.0 - 36.0 g/dL    Platelets 279.0 150.0 - 450.0 K/uL    RDW 12.7 %           Assessment & Plan       ICD-10-CM    1. Helicobacter pylori gastritis  K29.70 Helicobacter pylori Antigen Stool    B96.81    2. Hair loss  L65.9 TSH with free T4 reflex     CBC with Diff Plt ([facility])     DERMATOLOGY REFERRAL   3. Excessive bleeding in premenopausal period  N92.4 Prenatal Vit-Fe Fumarate-FA (PRENATAL MULTIVITAMIN W/IRON) 27-0.8 MG tablet      We need to recheck the h pylori test but it should be done 4 weeks AFTER medication completion. I have placed the order and you can bring it in when its due.    Continue with the omeprazole as needed.    Prenatal vitamin Rx was sent.    Labs today.    See Patient Instructions    Return in about 3 months (around 8/19/2020).    Cecy Huang PA-C  [facility]

## 2020-05-19 NOTE — NURSING NOTE
"29 year old  Chief Complaint   Patient presents with     RECHECK     thyroid labs      Recheck Medication     talk about vitamins, possible changes       Blood pressure 109/78, pulse 72, temperature 98.2  F (36.8  C), temperature source Oral, resp. rate 15, height 1.8 m (5' 10.87\"), weight 68 kg (150 lb), last menstrual period 05/12/2020, SpO2 99 %, not currently breastfeeding. Body mass index is 21 kg/m .  Patient Active Problem List   Diagnosis     Anxiety     Severe episode of recurrent major depressive disorder, without psychotic features (H)     PTSD (post-traumatic stress disorder)     Major depressive disorder, single episode, moderate (H)     Esophageal reflux     Alcohol use disorder, moderate, dependence (H)     Positive H. pylori test       Wt Readings from Last 2 Encounters:   05/19/20 68 kg (150 lb)   03/10/20 64.2 kg (141 lb 8 oz)     BP Readings from Last 3 Encounters:   05/19/20 109/78   03/10/20 100/68   01/09/20 (!) 86/56         Current Outpatient Medications   Medication     busPIRone (BUSPAR) 15 MG tablet     multivitamin, therapeutic (THERA-VIT) TABS tablet     omeprazole (PRILOSEC) 20 MG DR capsule     prazosin (MINIPRESS) 2 MG capsule     Skin Protectants, Misc. (EUCERIN) cream     traZODone (DESYREL) 100 MG tablet     Omega-3 Fatty Acids (FISH OIL) 500 MG CAPS     sertraline (ZOLOFT) 100 MG tablet     No current facility-administered medications for this visit.        Social History     Tobacco Use     Smoking status: Never Smoker     Smokeless tobacco: Never Used   Substance Use Topics     Alcohol use: Not Currently     Drug use: Never       Health Maintenance Due   Topic Date Due     PREVENTIVE CARE VISIT  1988     DEPRESSION ACTION PLAN  1988     PNEUMOCOCCAL IMMUNIZATION 19-64 MEDIUM RISK (1 of 1 - PPSV23) 08/23/2007       No results found for: PAP      May 19, 2020 2:53 PM    "

## 2020-05-19 NOTE — PATIENT INSTRUCTIONS
We need to recheck the h pylori test but it should be done 4 weeks AFTER medication completion. I have placed the order and you can bring it in when its due.    Continue with the omeprazole as needed.    Prenatal vitamin Rx was sent.    Labs today.

## 2020-05-19 NOTE — LETTER
May 20, 2020      Sharri Radha Bermeo  [address] [facility] [address]        Dear Sharri,    I am writing to inform you of your test results.  Your test results fall within the expected range(s) or remain unchanged from previous results.  Please continue with current treatment plan.  Resulted Orders   TSH with free T4 reflex   Result Value Ref Range    TSH 0.76 0.40 - 4.00 mU/L   CBC with Diff Plt ([facility])   Result Value Ref Range    WBC 5.0 4.0 - 11.0 K/uL    Lymphocytes # 1.7 0.8 - 5.3 K/uL    % Lymphocytes 33.3 20.0 - 48.0 %L    Mid # 0.8 0.0 - 2.2 K/uL    Mid % 15.5 0.0 - 20.0 %M    GRANULOCYTES # 2.5 1.6 - 8.3 K/uL    % Granulocytes 51.2 40.0 - 75.0 %G    RBC 4.41 3.80 - 5.20 M/uL    Hemoglobin 12.4 11.7 - 15.7 g/dL    Hematocrit 39.0 35.0 - 47.0 %    MCV 88.4 78.0 - 100.0 fL    MCH 28.1 26.5 - 35.0 pg    MCHC 31.8 (L) 32.0 - 36.0 g/dL    Platelets 279.0 150.0 - 450.0 K/uL    RDW 12.7 %     If you have any questions or concerns, please call the clinic at the number listed above. We can discuss further at your follow up appointment.       Sincerely,        Cecy Huang PA-C

## 2020-05-20 LAB
% GRANULOCYTES: 51.2 %G (ref 40–75)
ERYTHROCYTE [DISTWIDTH] IN BLOOD BY AUTOMATED COUNT: 12.7 %
GRANULOCYTES #: 2.5 K/UL (ref 1.6–8.3)
HCT VFR BLD AUTO: 39 % (ref 35–47)
HEMOGLOBIN: 12.4 G/DL (ref 11.7–15.7)
LYMPHOCYTES # BLD AUTO: 1.7 K/UL (ref 0.8–5.3)
LYMPHOCYTES NFR BLD AUTO: 33.3 %L (ref 20–48)
MCH RBC QN AUTO: 28.1 PG (ref 26.5–35)
MCHC RBC AUTO-ENTMCNC: 31.8 G/DL (ref 32–36)
MCV RBC AUTO: 88.4 FL (ref 78–100)
MID #: 0.8 K/UL (ref 0–2.2)
MID %: 15.5 %M (ref 0–20)
PLATELET # BLD AUTO: 279 K/UL (ref 150–450)
RBC # BLD AUTO: 4.41 M/UL (ref 3.8–5.2)
WBC # BLD AUTO: 5 K/UL (ref 4–11)

## 2020-07-07 ENCOUNTER — TELEPHONE (OUTPATIENT)
Dept: FAMILY MEDICINE | Facility: CLINIC | Age: 30
End: 2020-07-07

## 2020-07-07 NOTE — TELEPHONE ENCOUNTER
Summary:    Patient is due/failing the following:   PAP    Type of outreach:  none needed  Action needed: none needed- pt had pap done through [facility] on 3/5/2020    If need for provider review:    Please indicate OV, lab, MTM, or nurse appt if needed.  Indicate fasting or not fasting.                                                                                                                                          Nickie Polk MA  Medical Assistant  [facility]

## 2020-08-19 NOTE — PROGRESS NOTES
"  SUBJECTIVE:   Sharri is a 29 year old female who presents to clinic today for a return visit.    # Anxiety   # MDD   # PTSD   # AUD   - our first and most recent visit together was 3/10/20   - when she was transferred from inpatient at [facility] to [facility], the doses of several of her chronic psychoactive medications were change:               -Her dose of sertraline was decreased from her typical 200mg daily to 100mg               -Buspirone from 30mg three times daily to 10mg three times daily               -Trazodone 100mg to 50mg daily   - at our visit, we increased her Buspirone back to 30mg three times daily and planned to slowly re-titrate her sertraline back to 200mg  - wake up at 3-4 in the morning and can't get back to sleep, sometimes has issues falling asleep  - felt sleepier on 100mg of trazodone at bedtime so decreased to 50mg    - currently on Buspirone 30mg twice a day PRN, sertraline 200mg daily, Trazodone 50mg daily at bedtime  - psychiatry started her on Strattera 40mg daily and Wellbutrin 150mg daily in the morning    - graduated from [employer] in June and has been living with mother who is supportive but not someone she can talk to about her struggles  - the past couple of days has been at an \"[facility] house\", has intake assessment with them on 8/25/20 which would include psychiatry and therapy  - wants to stay there 2-3 weeks to get meds stabilized    - last time she saw psychiatrist was when she left [employer] [facility], trying to find a new one now  - has had a hard time \"applying herself\" the past couple of weeks  - wants to figure out \"what's wrong with [her]\"  - staying in bed a lof the day    - drinks 1 cup of coffee a day, drinks 2-3 cups of caffeinated tea a day      # GERD   # H. Pylori   - tested positive for H. Pylori stool antigen in March, had delay due to pandemic for treating   - treated by my colleague Shira Huang with triple therapy   - due to repeat fecal antigen test to " "confirm eradication   - still has heart burn symptoms sometimes, worse with coffee    - has been having trouble eating recently, eating like one meal a day  - appetite has been down for a couple of months  - thinks appetite being down is coming from her \"being really down\"    Wt Readings from Last 6 Encounters:   08/20/20 66.5 kg (146 lb 8 oz)   05/19/20 68 kg (150 lb)   03/10/20 64.2 kg (141 lb 8 oz)   01/09/20 59.6 kg (131 lb 8 oz)   03/18/10 60.8 kg (134 lb)         # Positive Fecal Calprotectin   - had calprotectin 94 in March 2020   - will repeat    ROS: Denies fevers, chills, chest pain, difficulty breathing    Patient Active Problem List   Diagnosis     Anxiety     Severe episode of recurrent major depressive disorder, without psychotic features (H)     PTSD (post-traumatic stress disorder)     Major depressive disorder, single episode, moderate (H)     Esophageal reflux     Alcohol use disorder, moderate, dependence (H)     Positive H. pylori test     Current Outpatient Medications   Medication     atomoxetine (STRATTERA) 40 MG capsule     buPROPion (WELLBUTRIN XL) 150 MG 24 hr tablet     busPIRone (BUSPAR) 15 MG tablet     omeprazole (PRILOSEC) 20 MG DR capsule     prazosin (MINIPRESS) 2 MG capsule     Prenatal Vit-Fe Fumarate-FA (PRENATAL MULTIVITAMIN W/IRON) 27-0.8 MG tablet     Skin Protectants, Misc. (EUCERIN) cream     traZODone (DESYREL) 100 MG tablet     sertraline (ZOLOFT) 100 MG tablet     No current facility-administered medications for this visit.        I have reviewed the patient's relevant past medical history.     OBJECTIVE:   /81   Pulse 79   Temp 98.1  F (36.7  C)   Resp 13   Ht 1.8 m (5' 10.87\")   Wt 66.5 kg (146 lb 8 oz)   LMP 08/03/2020   SpO2 100%   Breastfeeding No   BMI 20.51 kg/m      Constitutional: well-appearing, appears stated age  Eyes: conjunctivae without erythema, sclera anicteric.   Skin: no rashes, lesions, or wounds    Mental Status " Exam:  Appearance/Behaviour: appropriate attire, normal eye contact  Abnormal Motor Activity: none  Speech: normal rate, tone, and fluency; not pressured  Affect: appropriately reactive.  Thought Process: linear, goal-directed  Thought Content: no abnormal content  Perceptions: does not appear to be responding to internal stimuli  Cognition: intact  Insight/Judgment: good    ASSESSMENT AND PLAN:     (R53.83) Fatigue, unspecified type  (primary encounter diagnosis)  (F43.10) PTSD (post-traumatic stress disorder)  (F10.20) Alcohol use disorder, moderate, dependence (H)  (F33.2) Severe episode of recurrent major depressive disorder, without psychotic features (H)  Comment: I suspect that Sharri's fatigue and anhedonia are related to her depression. She has had many lab tests checked in the past 5 months which have for the most part been very normal.  Will check iron stores though given her H. Pylori infection but CBC was normal in May.   I think Sharri is showing excellent initiative and judgement by getting herself into this anchor house and establishing care with psychiatry and psychology.  Her medication regimen has become very complex at this point so I don't feel comfortable making adjustments to it at this time.  Recommended avoiding caffeine after 3pm in the afternoon.   She wants to follow up closely with me and we scheduled a visit for next Friday after she has her intake visit.   Plan: Ferritin, Iron and iron binding capacity, CMP    (K21.9) Gastroesophageal reflux disease, esophagitis presence not specified  Comment: Improved, still sometimes symptomatic. Will repeat stool h. Pylori antigen and calprotectin to ensure eradication of infection and normalization of calprotectin.   Plan:       Rell Ramirez MD   [facility]  08/20/2020, 12:22 PM

## 2020-08-20 ENCOUNTER — OFFICE VISIT (OUTPATIENT)
Dept: FAMILY MEDICINE | Facility: CLINIC | Age: 30
End: 2020-08-20
Payer: COMMERCIAL

## 2020-08-20 VITALS
BODY MASS INDEX: 20.51 KG/M2 | HEART RATE: 79 BPM | DIASTOLIC BLOOD PRESSURE: 81 MMHG | TEMPERATURE: 98.1 F | OXYGEN SATURATION: 100 % | SYSTOLIC BLOOD PRESSURE: 112 MMHG | RESPIRATION RATE: 13 BRPM | HEIGHT: 71 IN | WEIGHT: 146.5 LBS

## 2020-08-20 DIAGNOSIS — K21.9 GASTROESOPHAGEAL REFLUX DISEASE, ESOPHAGITIS PRESENCE NOT SPECIFIED: Chronic | ICD-10-CM

## 2020-08-20 DIAGNOSIS — F43.10 PTSD (POST-TRAUMATIC STRESS DISORDER): Chronic | ICD-10-CM

## 2020-08-20 DIAGNOSIS — R53.83 FATIGUE, UNSPECIFIED TYPE: Primary | ICD-10-CM

## 2020-08-20 DIAGNOSIS — F33.2 SEVERE EPISODE OF RECURRENT MAJOR DEPRESSIVE DISORDER, WITHOUT PSYCHOTIC FEATURES (H): Chronic | ICD-10-CM

## 2020-08-20 DIAGNOSIS — F10.20 ALCOHOL USE DISORDER, MODERATE, DEPENDENCE (H): Chronic | ICD-10-CM

## 2020-08-20 PROBLEM — F41.9 ANXIETY: Chronic | Status: ACTIVE | Noted: 2020-01-09

## 2020-08-20 LAB
ALBUMIN SERPL-MCNC: 4.2 G/DL (ref 3.4–5)
ALP SERPL-CCNC: 85 U/L (ref 40–150)
ALT SERPL W P-5'-P-CCNC: 23 U/L (ref 0–50)
ANION GAP SERPL CALCULATED.3IONS-SCNC: 5 MMOL/L (ref 3–14)
AST SERPL W P-5'-P-CCNC: 23 U/L (ref 0–45)
BILIRUB SERPL-MCNC: 0.4 MG/DL (ref 0.2–1.3)
BUN SERPL-MCNC: 11 MG/DL (ref 7–30)
CALCIUM SERPL-MCNC: 9 MG/DL (ref 8.5–10.1)
CHLORIDE SERPL-SCNC: 104 MMOL/L (ref 94–109)
CO2 SERPL-SCNC: 28 MMOL/L (ref 20–32)
CREAT SERPL-MCNC: 0.95 MG/DL (ref 0.52–1.04)
FERRITIN SERPL-MCNC: 208 NG/ML (ref 12–150)
GFR SERPL CREATININE-BSD FRML MDRD: 79 ML/MIN/{1.73_M2}
GLUCOSE SERPL-MCNC: 85 MG/DL (ref 70–99)
IRON SATN MFR SERPL: 23 % (ref 15–46)
IRON SERPL-MCNC: 75 UG/DL (ref 35–180)
POTASSIUM SERPL-SCNC: 4.2 MMOL/L (ref 3.4–5.3)
PROT SERPL-MCNC: 8.2 G/DL (ref 6.8–8.8)
SODIUM SERPL-SCNC: 137 MMOL/L (ref 133–144)
TIBC SERPL-MCNC: 326 UG/DL (ref 240–430)

## 2020-08-20 RX ORDER — BUSPIRONE HYDROCHLORIDE 15 MG/1
30 TABLET ORAL 2 TIMES DAILY PRN
Qty: 180 TABLET | Refills: 5
Start: 2020-08-20

## 2020-08-20 RX ORDER — ATOMOXETINE 40 MG/1
40 CAPSULE ORAL DAILY
COMMUNITY

## 2020-08-20 RX ORDER — BUPROPION HYDROCHLORIDE 150 MG/1
150 TABLET ORAL EVERY MORNING
COMMUNITY

## 2020-08-20 ASSESSMENT — ANXIETY QUESTIONNAIRES
GAD7 TOTAL SCORE: 15
6. BECOMING EASILY ANNOYED OR IRRITABLE: MORE THAN HALF THE DAYS
7. FEELING AFRAID AS IF SOMETHING AWFUL MIGHT HAPPEN: SEVERAL DAYS
5. BEING SO RESTLESS THAT IT IS HARD TO SIT STILL: NEARLY EVERY DAY
3. WORRYING TOO MUCH ABOUT DIFFERENT THINGS: MORE THAN HALF THE DAYS
1. FEELING NERVOUS, ANXIOUS, OR ON EDGE: NEARLY EVERY DAY
2. NOT BEING ABLE TO STOP OR CONTROL WORRYING: MORE THAN HALF THE DAYS
IF YOU CHECKED OFF ANY PROBLEMS ON THIS QUESTIONNAIRE, HOW DIFFICULT HAVE THESE PROBLEMS MADE IT FOR YOU TO DO YOUR WORK, TAKE CARE OF THINGS AT HOME, OR GET ALONG WITH OTHER PEOPLE: VERY DIFFICULT

## 2020-08-20 ASSESSMENT — PATIENT HEALTH QUESTIONNAIRE - PHQ9
5. POOR APPETITE OR OVEREATING: MORE THAN HALF THE DAYS
SUM OF ALL RESPONSES TO PHQ QUESTIONS 1-9: 20

## 2020-08-20 ASSESSMENT — MIFFLIN-ST. JEOR: SCORE: 1473.52

## 2020-08-20 NOTE — NURSING NOTE
"29 year old  Chief Complaint   Patient presents with     Back Pain     that starts in her back and radiates to her neck. ongoing for a month     Mental Health Problem     RECHECK     also reports not being able to eat much really due to low apetite       Blood pressure 112/81, pulse 79, temperature 98.1  F (36.7  C), resp. rate 13, height 1.8 m (5' 10.87\"), weight 66.5 kg (146 lb 8 oz), last menstrual period 08/03/2020, SpO2 100 %, not currently breastfeeding. Body mass index is 20.51 kg/m .  Patient Active Problem List   Diagnosis     Anxiety     Severe episode of recurrent major depressive disorder, without psychotic features (H)     PTSD (post-traumatic stress disorder)     Major depressive disorder, single episode, moderate (H)     Esophageal reflux     Alcohol use disorder, moderate, dependence (H)     Positive H. pylori test       Wt Readings from Last 2 Encounters:   08/20/20 66.5 kg (146 lb 8 oz)   05/19/20 68 kg (150 lb)     BP Readings from Last 3 Encounters:   08/20/20 112/81   05/19/20 109/78   03/10/20 100/68         Current Outpatient Medications   Medication     busPIRone (BUSPAR) 15 MG tablet     omeprazole (PRILOSEC) 20 MG DR capsule     prazosin (MINIPRESS) 2 MG capsule     Skin Protectants, Misc. (EUCERIN) cream     traZODone (DESYREL) 100 MG tablet     multivitamin, therapeutic (THERA-VIT) TABS tablet     Omega-3 Fatty Acids (FISH OIL) 500 MG CAPS     Prenatal Vit-Fe Fumarate-FA (PRENATAL MULTIVITAMIN W/IRON) 27-0.8 MG tablet     sertraline (ZOLOFT) 100 MG tablet     No current facility-administered medications for this visit.        Social History     Tobacco Use     Smoking status: Never Smoker     Smokeless tobacco: Never Used   Substance Use Topics     Alcohol use: Not Currently     Drug use: Never       Health Maintenance Due   Topic Date Due     PREVENTIVE CARE VISIT  1988     DEPRESSION ACTION PLAN  1988     PNEUMOCOCCAL IMMUNIZATION 19-64 MEDIUM RISK (1 of 1 - PPSV23) " 08/23/2007       No results found for: PAP      August 20, 2020 10:19 AM

## 2020-08-21 ASSESSMENT — ANXIETY QUESTIONNAIRES: GAD7 TOTAL SCORE: 15

## 2021-03-22 PROBLEM — K76.0 HEPATIC STEATOSIS: Chronic | Status: ACTIVE | Noted: 2021-03-22

## 2021-05-30 ENCOUNTER — RECORDS - HEALTHEAST (OUTPATIENT)
Dept: ADMINISTRATIVE | Facility: CLINIC | Age: 31
End: 2021-05-30

## 2022-04-21 ENCOUNTER — OFFICE VISIT (OUTPATIENT)
Dept: URGENT CARE | Facility: URGENT CARE | Age: 32
End: 2022-04-21
Payer: COMMERCIAL

## 2022-04-21 ENCOUNTER — TELEPHONE (OUTPATIENT)
Dept: URGENT CARE | Facility: URGENT CARE | Age: 32
End: 2022-04-21

## 2022-04-21 VITALS
SYSTOLIC BLOOD PRESSURE: 100 MMHG | DIASTOLIC BLOOD PRESSURE: 70 MMHG | BODY MASS INDEX: 16.69 KG/M2 | HEART RATE: 79 BPM | RESPIRATION RATE: 22 BRPM | WEIGHT: 119.2 LBS | TEMPERATURE: 98.6 F | OXYGEN SATURATION: 100 %

## 2022-04-21 DIAGNOSIS — N92.6 MISSED PERIOD: ICD-10-CM

## 2022-04-21 DIAGNOSIS — N92.4 EXCESSIVE BLEEDING IN PREMENOPAUSAL PERIOD: ICD-10-CM

## 2022-04-21 DIAGNOSIS — L02.419 ABSCESS, AXILLA: Primary | ICD-10-CM

## 2022-04-21 LAB — HCG UR QL: NEGATIVE

## 2022-04-21 PROCEDURE — 99213 OFFICE O/P EST LOW 20 MIN: CPT | Performed by: PHYSICIAN ASSISTANT

## 2022-04-21 PROCEDURE — 81025 URINE PREGNANCY TEST: CPT | Performed by: PHYSICIAN ASSISTANT

## 2022-04-21 RX ORDER — SULFAMETHOXAZOLE/TRIMETHOPRIM 800-160 MG
1 TABLET ORAL 2 TIMES DAILY
Qty: 14 TABLET | Refills: 0 | Status: SHIPPED | OUTPATIENT
Start: 2022-04-21 | End: 2022-04-28

## 2022-04-21 RX ORDER — PRENATAL VIT/IRON FUM/FOLIC AC 27MG-0.8MG
1 TABLET ORAL DAILY
Qty: 90 TABLET | Refills: 3 | Status: SHIPPED | OUTPATIENT
Start: 2022-04-21

## 2022-04-21 NOTE — PROGRESS NOTES
Missed period  - HCG Qual, Urine ([id number]); Future  - HCG Qual, Urine ([id number])    Abscess, axilla  - sulfamethoxazole-trimethoprim (BACTRIM DS) 800-160 MG tablet; Take 1 tablet by mouth 2 times daily for 7 days    Excessive bleeding in premenopausal period  - Prenatal Vit-Fe Fumarate-FA (PRENATAL MULTIVITAMIN W/IRON) 27-0.8 MG tablet; Take 1 tablet by mouth daily     [facility]    Subjective   31 year old who presents to clinic today for the following health issues:    Derm Problem and Pregnancy Test       HPI     Have a lump under left armpit and it's growing every day, it hurts, and itching-today is the 4th day. Denies fever.     LMP was 3 months ago, did a home test but its negative. Patient denies any other nausea symptoms. Patient is not currently on birth control.     Review of Systems   Review of Systems   See HPI     Objective    Temp: 98.6  F (37  C) Temp src: Tympanic BP: 100/70 Pulse: 79   Resp: 22 SpO2: 100 %       Physical Exam   Physical Exam  Constitutional:       General: She is not in acute distress.     Appearance: Normal appearance. She is normal weight. She is not ill-appearing, toxic-appearing or diaphoretic.   HENT:      Head: Normocephalic and atraumatic.   Cardiovascular:      Rate and Rhythm: Normal rate.      Pulses: Normal pulses.   Pulmonary:      Effort: Pulmonary effort is normal. No respiratory distress.   Skin:     Comments: There is a small 2 cm abscess under the left underarm. It's erythematous, tender to touch, and fluctuant.    Neurological:      General: No focal deficit present.      Mental Status: She is alert and oriented to person, place, and time. Mental status is at baseline.      Gait: Gait normal.   Psychiatric:         Mood and Affect: Mood normal.         Behavior: Behavior normal.         Thought Content: Thought content normal.         Judgment: Judgment normal.          Results for orders placed or performed in visit on  04/21/22 (from the past 24 hour(s))   HCG Qual, Urine ([id number])   Result Value Ref Range    hCG Urine Qualitative Negative Negative

## 2022-04-21 NOTE — TELEPHONE ENCOUNTER
Plan does not cover Prenatal Vit-Fe Fumarate-FA (PRENATAL MULTIVITAMIN W/IRON) 27-0.8 MG tablet. Please call [phone] to initiate prior authorization or switch to alternative medication.    Patient ID #: [id number]

## 2022-04-24 ENCOUNTER — HEALTH MAINTENANCE LETTER (OUTPATIENT)
Age: 32
End: 2022-04-24

## 2022-11-19 ENCOUNTER — HEALTH MAINTENANCE LETTER (OUTPATIENT)
Age: 32
End: 2022-11-19

## 2023-06-01 ENCOUNTER — HEALTH MAINTENANCE LETTER (OUTPATIENT)
Age: 33
End: 2023-06-01

## 2023-08-14 ENCOUNTER — TELEPHONE (OUTPATIENT)
Dept: PLASTIC SURGERY | Facility: CLINIC | Age: 33
End: 2023-08-14

## 2023-08-14 NOTE — TELEPHONE ENCOUNTER
Spoke with patient who has a history of missed appointments.    We discussed the importance of keeping her appointments or canceling.  Patient agreed and understood.    Mine Williamson RN

## 2024-06-15 ENCOUNTER — HEALTH MAINTENANCE LETTER (OUTPATIENT)
Age: 34
End: 2024-06-15